# Patient Record
Sex: FEMALE | Race: WHITE | NOT HISPANIC OR LATINO | Employment: OTHER | URBAN - METROPOLITAN AREA
[De-identification: names, ages, dates, MRNs, and addresses within clinical notes are randomized per-mention and may not be internally consistent; named-entity substitution may affect disease eponyms.]

---

## 2017-01-05 ENCOUNTER — TELEPHONE (OUTPATIENT)
Dept: CARDIOLOGY | Facility: CLINIC | Age: 82
End: 2017-01-05

## 2017-01-05 NOTE — TELEPHONE ENCOUNTER
01/05/17  10:18 AM  Rain Adams  9/22/1930    Pt's son, Felipa  [phone], calls to inquire about some disability forms that he says were faxed to our office yesterday about this pt (Mr. And Mrs. Adams) both see Dr. Gutierrez. Felipa says that he received a call from our office this morning and he was returning it; I see no documentation where anyone was trying to contact him. He said he faxed the forms to Dr. Gomez yesterday.     After investigating, I realize that both  And Mrs. Adams see Dr. Gutierrez, but also see Dr. Hoyt (PCP). I called Felipa back who says he did fax the forms to Dr. Hoyt's office; he will call there for follow-up. I could not tell him who contacted him from our office.    Nohemy PENA RN